# Patient Record
Sex: FEMALE | Race: WHITE | NOT HISPANIC OR LATINO | URBAN - METROPOLITAN AREA
[De-identification: names, ages, dates, MRNs, and addresses within clinical notes are randomized per-mention and may not be internally consistent; named-entity substitution may affect disease eponyms.]

---

## 2017-06-13 ENCOUNTER — OFFICE VISIT - HEALTHEAST (OUTPATIENT)
Dept: GERIATRICS | Facility: CLINIC | Age: 82
End: 2017-06-13

## 2017-06-13 ENCOUNTER — AMBULATORY - HEALTHEAST (OUTPATIENT)
Dept: ADMINISTRATIVE | Facility: CLINIC | Age: 82
End: 2017-06-13

## 2017-06-13 DIAGNOSIS — I10 HYPERTENSION: ICD-10-CM

## 2017-06-13 DIAGNOSIS — N28.9 KIDNEY DISEASE: ICD-10-CM

## 2017-06-13 DIAGNOSIS — R41.0 CONFUSION: ICD-10-CM

## 2017-06-13 DIAGNOSIS — I50.9 CONGESTIVE HEART FAILURE (H): ICD-10-CM

## 2017-06-13 RX ORDER — BISACODYL 5 MG/1
5 TABLET, DELAYED RELEASE ORAL DAILY PRN
Status: SHIPPED | COMMUNITY
Start: 2017-06-08

## 2017-06-13 RX ORDER — TORSEMIDE 20 MG/1
40 TABLET ORAL 2 TIMES DAILY
Status: SHIPPED | COMMUNITY
Start: 2017-06-10

## 2017-06-13 RX ORDER — ACETAMINOPHEN 325 MG/1
325 TABLET ORAL EVERY 4 HOURS PRN
Status: SHIPPED | COMMUNITY
Start: 2017-06-10

## 2017-06-13 RX ORDER — CARVEDILOL 6.25 MG/1
6.25 TABLET ORAL 2 TIMES DAILY WITH MEALS
Status: SHIPPED | COMMUNITY
Start: 2017-06-08

## 2017-06-13 RX ORDER — LANOLIN ALCOHOL/MO/W.PET/CERES
3 CREAM (GRAM) TOPICAL AT BEDTIME
Status: SHIPPED | COMMUNITY
Start: 2017-06-10

## 2017-06-20 ENCOUNTER — OFFICE VISIT - HEALTHEAST (OUTPATIENT)
Dept: GERIATRICS | Facility: CLINIC | Age: 82
End: 2017-06-20

## 2017-06-20 DIAGNOSIS — R41.0 CONFUSION: ICD-10-CM

## 2017-06-20 DIAGNOSIS — I10 HYPERTENSION: ICD-10-CM

## 2017-06-20 DIAGNOSIS — N28.9 KIDNEY DISEASE: ICD-10-CM

## 2017-06-20 DIAGNOSIS — I50.9 CONGESTIVE HEART FAILURE (H): ICD-10-CM

## 2017-06-22 ENCOUNTER — OFFICE VISIT - HEALTHEAST (OUTPATIENT)
Dept: GERIATRICS | Facility: CLINIC | Age: 82
End: 2017-06-22

## 2017-06-22 DIAGNOSIS — I50.9 CONGESTIVE HEART FAILURE (H): ICD-10-CM

## 2017-06-22 DIAGNOSIS — N28.9 KIDNEY DISEASE: ICD-10-CM

## 2017-06-22 DIAGNOSIS — I10 HYPERTENSION: ICD-10-CM

## 2017-06-22 DIAGNOSIS — R41.0 CONFUSION: ICD-10-CM

## 2017-06-30 ENCOUNTER — AMBULATORY - HEALTHEAST (OUTPATIENT)
Dept: GERIATRICS | Facility: CLINIC | Age: 82
End: 2017-06-30

## 2021-05-31 VITALS — WEIGHT: 90 LBS

## 2021-06-11 NOTE — PROGRESS NOTES
[facility]      Code Status:  DNR/DNI  Visit Type: H & P     Facility:  [facility] [[id number]]           History of Present Illness: Leisa Davila is a 89 y.o. female who is currently admitted to the TCU as a transfer from the hospital  As per the history this is a 89-year-old who was admitted on 6/5/2017 with acute on chronic congestive heart failure.  She has a past medical history for hypertension peripheral vascular disease and hyperlipidemia.  She was recently started on Lasix for dyspnea and elevated bnp.  Subsequently she was switched over to torsemide.  An outpatient echocardiogram had shown ejection fraction of 30% and she presented to the hospital with progressive dyspnea and she had gained greater than 10 pounds of weight  Cardiology saw her and switched her to torsemide 20 mg daily along with Lasix she was also switched from metoprolol to Coreg she was noted to have an NST MI with EKG showing some changes and elevated troponins  However an echo angiogram has been deferred and will be discussed with the patient as an outpatient  Her blood pressures were running on the low side which was felt to limited ability to diurese in addition she has chronic kidney disease stage III.  Her admission creatinine was 2.1 and it was felt overall with her advanced age family was not too keen on invasive workup.  L in the hospital she was seen by psychiatry due to progressive confusion/delirium.  She remains confused has been noted to be an elopement risk and wondering at nighttime.  She told me she was missing her purse which had $1000 and it.  She was started on melatonin and given olanzapine at bedtime for 5 days only with Haldol in the hospital  Eating poorly as per her with 0 appetite she remains weak and disabled.    Past Medical History:   Diagnosis Date     Acute congestive heart failure      Acute coronary syndrome      Allergic conjunctivitis      Anemia      CKD  (chronic kidney disease) stage 3, GFR 30-59 ml/min      Claudication      HTN (hypertension)      Lung nodule      Malignant neoplasm of colon      MI, acute, non ST segment elevation      Non Hodgkin's lymphoma      Nonexudative senile macular degeneration of retina      Pure hypercholesterolemia      PVD (peripheral vascular disease)      Sepsis      Past Surgical History:   Procedure Laterality Date     ANGIOPLASTY Left     left leg     BREAST BIOPSY Left      COLECTOMY       Family History   Problem Relation Age of Onset     Stroke Father      Cancer Sister      Social History     Social History     Marital status: N/A     Spouse name: N/A     Number of children: N/A     Years of education: N/A     Occupational History     Not on file.     Social History Main Topics     Smoking status: Never Smoker     Smokeless tobacco: Never Used     Alcohol use No     Drug use: No     Sexual activity: Not on file     Other Topics Concern     Not on file     Social History Narrative     No narrative on file     Current Outpatient Prescriptions   Medication Sig Dispense Refill     acetaminophen (TYLENOL) 325 MG tablet Take 325 mg by mouth every 4 (four) hours as needed.       aspirin 81 MG EC tablet Take 1 tablet by mouth daily.       bisacodyl (DULCOLAX) 5 mg EC tablet Take 5 mg by mouth daily as needed.       carvedilol (COREG) 6.25 MG tablet Take 6.25 mg by mouth 2 (two) times a day with meals.       melatonin 3 mg Tab tablet Take 3 mg by mouth at bedtime.       OLANZapine (ZYPREXA) 5 MG tablet Take 5 mg by mouth at bedtime.       torsemide (DEMADEX) 20 MG tablet Take 40 mg by mouth 2 (two) times a day.       No current facility-administered medications for this visit.      Allergies   Allergen Reactions     Codeine Other (See Comments)     Hallucinations          Review of Systems:    Constitutional: Negative.  Negative for fever, chills, has activity change, appetite change and fatigue.   HENT: Negative for congestion and  facial swelling.    Eyes: Negative for photophobia, redness and visual disturbance.   Respiratory: Negative for cough and chest tightness.    Cardiovascular: Negative for chest pain, palpitations and leg swelling.   Gastrointestinal: Negative for nausea, diarrhea, constipation, blood in stool and abdominal distention.   Genitourinary: Negative.    Musculoskeletal: Negative.  weak  Skin: Negative.    Neurological: Negative for dizziness, tremors, syncope,has  weakness, light-headedness and headaches.   Hematological: Does not bruise/bleed easily.   Psychiatric/Behavioral: Negative.  confused    Vitals:    17 1625   BP: 132/67   Pulse: 75   Resp: 18   Temp: 98  F (36.7  C)       Physical Exam:    GENERAL: no acute distress. Cooperative in conversation.   HEENT: pupils are equal, round and reactive. Oral mucosa is moist and intact.  RESP:Chest symmetric. Regular respiratory rate. No stridor.  CVS: S1S2; systolic murmur heard  ABD: Nondistended, soft.  EXTREMITIES: trace lower extremity edema.   NEURO: non focal. Alert and oriented x 2 very confused.   PSYCH: within normal limits. No depression or anxiety.  SKIN: warm dry intact       Labs:       User, Prosolv - 2017 12:42 PM CDT    ECHO LIMITED WO CONTRAST SIRENA TURCIOS 2017 10:28    [facility] [place] [address]  [phone] [email or web address]    Transthoracic Echo Report  SIRENA TURCIOS  Jaden ID: [id number] Age: 89 : 1924 Ordering Provider: STEVE FERNANDEZ  Exam Date: 2017 10:28 Gender: F Sonographer: FENG  Accession #: [id number] Height: 64 in BSA: 1.46 m  BP: 121 / 76  Weight: 101 lbs BMI: 17.3 kg/m  HR: 52  Location: Inpatient (Portable) Rhythm: Sinus with frequent ectopy  Procedure Components: Limited 2D imaging, Color Doppler, Limited Spectral Doppler  Indications: MI  Technical Quality: Adequate Contrast: None    Final Conclusion Previous Study:  5/18/17  1) Diffuse left ventricular hypokinesis with severe depression of global left ventricular systolic function. Estimated ejection fraction ~  25%    2) Mild mitral and aortic insufficiencies    3) Moderate to moderately sever tricuspid insufficiency. Moderate elevation of estimated pulmonary arterial systolic pressure    4) Bi-atrial enlargement    5) Mild to moderate impairment of right ventricular systolic function    Compared to prior echocardiogram (May 18, 2017), probably no significant interval change. The degree of tricuspid insufficiency may  have increased, but this sis probably a reflection of more images of tricuspid valve obtained on the current study.    Estimated EF: 25%  FINDINGS  Left Ventricle Normal LV wall thickness. Diffuse left ventricular hypokinesis. Estimated ejection fraction ~ 25%  Diastolic Function Indeterminate left ventricular diastolic function.  Right Ventricle Normal RV size. Mild to moderate impairment of right ventricular systolic function.  Left Atrium Mild LAE  Right Atrium Mild WILL  Atrial Septum No left to right shunt was detected by limited color flow Doppler interrogation of the interatrial septum.  Inferior Vena Cava Normal inferior vena cava (IVC) size.  Aortic Valve Moderate calcification of the non-coronary leaflet of the aortic valve. Mild aortic insufficiency  Mitral Valve Moderate mitral anular calcification. There is calcification of the posterior leaflet as well Mild mitral insufficiency  Tricuspid Valve Moderate to moderately severe tricuspid insufficiency. Estimated RV systolic pressure of 48.2 mmHg + RA  pressure.  Pulmonic Valve Not well seen  Aorta Aortic root and proximal ascending aorta are normal in size.  Pericardium No pericardial effusion.  Add. Comments Left pleural effusion.  MEASUREMENTS (Male / Female) Normal Values  2D MEASUREMENTS AND LV FUNCTION  IVS Diastolic Thickness 0.924 cm < 1.1 cm / < 1.0 cm  LV Diastolic Diameter PLAX 4.65 cm 4.2 -  5.9 / 3.9 - 5.3 cm  LVPW Diastolic Thickness 0.81 cm < 1.1 cm / < 1.0 cm  LV Systolic Diameter PLAX 4.08 cm  LVOT Diameter 1.8 cm  LVOT Cardiac Output 1.6 l/min  LVOT Cardiac Index 1.12 l/min m   LVOT Stroke Volume 30.8 ml  Stroke Volume Index 21.6 ml/m   LA Systolic Diameter LX 3.8 cm 3.0 - 4.0 / 2.7 - 3.8 cm  LV Ejection Fraction MOD BP 20.4 % >= 55 %  IVC Diameter Inspiration 1.6 cm  IVC Diameter Expiration 2 cm    DIASTOLOGY  Mitral E Point Velocity 1.14 m/sec 0.70 - 1.02 m/sec  Mitral A Point Velocity 0.422 m/sec 0.06 - 1.06 m/sec  Mitral E to A Ratio 2.7 1.1 - 2.1  MV Deceleration Time 82 msec 167 - 231 msec  LV E' Lateral Velocity 0.049 m/sec  Mitral E to LV E' Lateral Ratio 23.3    AORTIC VALVE  Aortic Root Diameter 2.8 cm  LVOT Peak Velocity 0.661 m/sec  LVOT Velocity Time Integral 12.1 cm  AI Pressure Half Time 441 msec    TRICUSPID VALVE AND ESTIMATED PRESSURES  TR Peak Velocity 3.47 m/sec  TR Peak Gradient 48.2 mmHg        Jim Gonzales MD  (Electronically Signed) [facility]redited [facility]  Final Date: 06 June 2017 12:42          ICD-10 Codes: I232     Back to top of Results      BRAIN NATRIURETIC PEPTIDE (06/06/2017 8:24 AM)  Only the most recent of 3 results within the time period is included.    BRAIN NATRIURETIC PEPTIDE (06/06/2017 8:24 AM)   Component Value Ref Range   BRAIN ANDRÉS PEPTIDE 7232 (H) <265 pg/mL     BRAIN NATRIURETIC PEPTIDE (06/06/2017 8:24 AM)   Specimen Performing Laboratory   [facility]    SENDOUT INTERNAL ZIP [id number]    [address] [place] [address]         Assessment/Plan:    Acute on chronic congestive heart failure with systolic dysfunction with an ejection fraction of 30%.  She was admitted in the hospital and evaluated by cardiology  Early discharge in a stable condition and remains on torsemide with discontinuation of her Lasix  NST RICHARD with EKG showing ST and T-wave changes and troponins elevated to 5.6.  Evaluated by cardiology but family  did not want to pursue aggressive interventions and refused angiography in light of patient's advanced age and renal failure.  They will do an outpatient follow-up with cardiology as scheduled  Hypertension with hypotension reported in the hospital she was taken off metoprolol and switched over to Coreg at a lower dose.  Continue to monitor blood pressures in the TCU.  Acute encephalopathy/delirium with concerning regarding underlying major neurocognitive disorder  Patient remains extremely confused with paranoid statements on exam today  She was discharged from the hospital on melatonin for sleep-wake cycle disturbances as well as given Zyprexa for 5 days by psychiatric continue to monitor closely she remains confused she was wandering last night and remains an elopement risk  Chronic kidney disease stage III with a baseline creatinine of 2.2.  She was seen by nephrology in the hospital and her torsemide dosage was increased.  History of non-Hodgkin's lymphoma  History of malignant colon cancer status post resection  Recent diagnosis of a lung nodule for which patient declined further workup  Debilitation she is here for PT OT and rehab and my suspicion is that she may need alternative placement due to her advanced age and inability to function in her own home    Total time spent was 45 minutes, more than half of it was in face-to-face counseling regarding disease state, treatment, side effects, documentation, review of clinical data and coordination of care    Electronically signed by: LETTY Reilly  This progress note was completed using Dragon software and there may be grammatical errors.

## 2021-06-11 NOTE — PROGRESS NOTES
[facility]      Code Status:  DNR/DNI  Visit Type: Review Of Multiple Medical Conditions     Facility:  [facility] [[id number]]           History of Present Illness: Leisa Davila is a 89 y.o. female who is currently admitted to the TCU as a transfer from the hospital  As per the history this is a 89-year-old who was admitted on 6/5/2017 with acute on chronic congestive heart failure.  She has a past medical history for hypertension peripheral vascular disease and hyperlipidemia.  She was recently started on Lasix for dyspnea and elevated bnp.  Subsequently she was switched over to torsemide.  An outpatient echocardiogram had shown ejection fraction of 30% and she presented to the hospital with progressive dyspnea and she had gained greater than 10 pounds of weight  Cardiology saw her and switched her to torsemide 20 mg daily along with Lasix she was also switched from metoprolol to Coreg she was noted to have an NST MI with EKG showing some changes and elevated troponins  However an echo angiogram has been deferred and will be discussed with the patient as an outpatient  Her blood pressures were running on the low side which was felt to limited ability to diurese in addition she has chronic kidney disease stage III.  Her admission creatinine was 2.1 and it was felt overall with her advanced age family was not too keen on invasive workup.  Recheck labs done today continue to show an impaired creatinine at 2.49.  L in the hospital she was seen by psychiatry due to progressive confusion/delirium.  She remains confused has been noted to be an elopement risk and wondering at nighttime.  She told me she was missing her purse which had $1000 and it.  She was started on melatonin and given olanzapine at bedtime for 5 days only with Haldol in the hospital  Eating poorly as per her with 0 appetite she remains weak and disabled.  She had a fall last night when she was found in the  bathroom again this is not a verified fall due to her confusion but she had an episode of incontinence associated with it.  She remains quite confused and told me she was discharging to her home today and was backing to do that    Past Medical History:   Diagnosis Date     Acute congestive heart failure      Acute coronary syndrome      Allergic conjunctivitis      Anemia      CKD (chronic kidney disease) stage 3, GFR 30-59 ml/min      Claudication      HTN (hypertension)      Lung nodule      Malignant neoplasm of colon      MI, acute, non ST segment elevation      Non Hodgkin's lymphoma      Nonexudative senile macular degeneration of retina      Pure hypercholesterolemia      PVD (peripheral vascular disease)      Sepsis      Past Surgical History:   Procedure Laterality Date     ANGIOPLASTY Left     left leg     BREAST BIOPSY Left      COLECTOMY       Family History   Problem Relation Age of Onset     Stroke Father      Cancer Sister      Social History     Social History     Marital status:      Spouse name: N/A     Number of children: N/A     Years of education: N/A     Occupational History     Not on file.     Social History Main Topics     Smoking status: Never Smoker     Smokeless tobacco: Never Used     Alcohol use No     Drug use: No     Sexual activity: Not on file     Other Topics Concern     Not on file     Social History Narrative     No narrative on file     Current Outpatient Prescriptions   Medication Sig Dispense Refill     acetaminophen (TYLENOL) 325 MG tablet Take 325 mg by mouth every 4 (four) hours as needed.       aspirin 81 MG EC tablet Take 1 tablet by mouth daily.       bisacodyl (DULCOLAX) 5 mg EC tablet Take 5 mg by mouth daily as needed.       carvedilol (COREG) 6.25 MG tablet Take 6.25 mg by mouth 2 (two) times a day with meals.       melatonin 3 mg Tab tablet Take 3 mg by mouth at bedtime.       torsemide (DEMADEX) 20 MG tablet Take 40 mg by mouth 2 (two) times a day.       No  current facility-administered medications for this visit.      Allergies   Allergen Reactions     Codeine Other (See Comments)     Hallucinations          Review of Systems:    Constitutional: Negative.  Negative for fever, chills, has activity change, appetite change and fatigue.   HENT: Negative for congestion and facial swelling.    Eyes: Negative for photophobia, redness and visual disturbance.   Respiratory: Negative for cough and chest tightness.    Cardiovascular: Negative for chest pain, palpitations and leg swelling.   Gastrointestinal: Negative for nausea, diarrhea, constipation, blood in stool and abdominal distention.   Genitourinary: Negative.    Musculoskeletal: Negative.  weak  Skin: Negative.    Neurological: Negative for dizziness, tremors, syncope,has  weakness, light-headedness and headaches.   Hematological: Does not bruise/bleed easily.   Psychiatric/Behavioral: Negative.  confused    Vitals:    17 1520   BP: 134/76   Pulse: 65   Resp: 18   Temp: 97  F (36.1  C)       Physical Exam:    GENERAL: no acute distress. Cooperative in conversation.   HEENT: pupils are equal, round and reactive. Oral mucosa is moist and intact.  RESP:Chest symmetric. Regular respiratory rate. No stridor.  CVS: S1S2; systolic murmur heard  ABD: Nondistended, soft.  EXTREMITIES: trace lower extremity edema.   NEURO: non focal. Alert and oriented x 2 very confused.   PSYCH: within normal limits. No depression or anxiety.  SKIN: warm dry intact       Labs:       User, Prosolv - 2017 12:42 PM CDT    ECHO LIMITED WO CONTRAST SIRENA TURCIOS 2017 10:28    [facility] [place] [address]  [phone] [email or web address]    Transthoracic Echo Report  SIRENA TURCIOS  [place]ancho[place] ID: [id number] Age: 89 : 1924 Ordering Provider: STEVE FERNANDEZ  Exam Date: 2017 10:28 Gender: F Sonographer: FENG  Accession #: [id number] Height: 64  in BSA: 1.46 m  BP: 121 / 76  Weight: 101 lbs BMI: 17.3 kg/m  HR: 52  Location: Inpatient (Portable) Rhythm: Sinus with frequent ectopy  Procedure Components: Limited 2D imaging, Color Doppler, Limited Spectral Doppler  Indications: MI  Technical Quality: Adequate Contrast: None    Final Conclusion Previous Study: 5/18/17  1) Diffuse left ventricular hypokinesis with severe depression of global left ventricular systolic function. Estimated ejection fraction ~  25%    2) Mild mitral and aortic insufficiencies    3) Moderate to moderately sever tricuspid insufficiency. Moderate elevation of estimated pulmonary arterial systolic pressure    4) Bi-atrial enlargement    5) Mild to moderate impairment of right ventricular systolic function    Compared to prior echocardiogram (May 18, 2017), probably no significant interval change. The degree of tricuspid insufficiency may  have increased, but this sis probably a reflection of more images of tricuspid valve obtained on the current study.    Estimated EF: 25%  FINDINGS  Left Ventricle Normal LV wall thickness. Diffuse left ventricular hypokinesis. Estimated ejection fraction ~ 25%  Diastolic Function Indeterminate left ventricular diastolic function.  Right Ventricle Normal RV size. Mild to moderate impairment of right ventricular systolic function.  Left Atrium Mild LAE  Right Atrium Mild WILL  Atrial Septum No left to right shunt was detected by limited color flow Doppler interrogation of the interatrial septum.  Inferior Vena Cava Normal inferior vena cava (IVC) size.  Aortic Valve Moderate calcification of the non-coronary leaflet of the aortic valve. Mild aortic insufficiency  Mitral Valve Moderate mitral anular calcification. There is calcification of the posterior leaflet as well Mild mitral insufficiency  Tricuspid Valve Moderate to moderately severe tricuspid insufficiency. Estimated RV systolic pressure of 48.2 mmHg + RA  pressure.  Pulmonic Valve Not well  seen  Aorta Aortic root and proximal ascending aorta are normal in size.  Pericardium No pericardial effusion.  Add. Comments Left pleural effusion.  MEASUREMENTS (Male / Female) Normal Values  2D MEASUREMENTS AND LV FUNCTION  IVS Diastolic Thickness 0.924 cm < 1.1 cm / < 1.0 cm  LV Diastolic Diameter PLAX 4.65 cm 4.2 - 5.9 / 3.9 - 5.3 cm  LVPW Diastolic Thickness 0.81 cm < 1.1 cm / < 1.0 cm  LV Systolic Diameter PLAX 4.08 cm  LVOT Diameter 1.8 cm  LVOT Cardiac Output 1.6 l/min  LVOT Cardiac Index 1.12 l/min m   LVOT Stroke Volume 30.8 ml  Stroke Volume Index 21.6 ml/m   LA Systolic Diameter LX 3.8 cm 3.0 - 4.0 / 2.7 - 3.8 cm  LV Ejection Fraction MOD BP 20.4 % >= 55 %  IVC Diameter Inspiration 1.6 cm  IVC Diameter Expiration 2 cm    DIASTOLOGY  Mitral E Point Velocity 1.14 m/sec 0.70 - 1.02 m/sec  Mitral A Point Velocity 0.422 m/sec 0.06 - 1.06 m/sec  Mitral E to A Ratio 2.7 1.1 - 2.1  MV Deceleration Time 82 msec 167 - 231 msec  LV E' Lateral Velocity 0.049 m/sec  Mitral E to LV E' Lateral Ratio 23.3    AORTIC VALVE  Aortic Root Diameter 2.8 cm  LVOT Peak Velocity 0.661 m/sec  LVOT Velocity Time Integral 12.1 cm  AI Pressure Half Time 441 msec    TRICUSPID VALVE AND ESTIMATED PRESSURES  TR Peak Velocity 3.47 m/sec  TR Peak Gradient 48.2 mmHg        Jim Gonzales MD  (Electronically Signed) [facility]  Final Date: 06 June 2017 12:42          ICD-10 Codes: I232     Back to top of Results      BRAIN NATRIURETIC PEPTIDE (06/06/2017 8:24 AM)  Only the most recent of 3 results within the time period is included.    BRAIN NATRIURETIC PEPTIDE (06/06/2017 8:24 AM)   Component Value Ref Range   BRAIN ANDRÉS PEPTIDE 7232 (H) <265 pg/mL     BRAIN NATRIURETIC PEPTIDE (06/06/2017 8:24 AM)   Specimen Performing Laboratory   Blood [facility]    SENDOUT INTERNAL ZIP [id number]    [address] [place] [address]       Recent Results (from the past 240 hour(s))   Magnesium   Result Value Ref Range     Magnesium 2.3 1.8 - 2.6 mg/dL   Basic Metabolic Panel   Result Value Ref Range    Sodium 140 136 - 145 mmol/L    Potassium 3.5 3.5 - 5.0 mmol/L    Chloride 91 (L) 98 - 107 mmol/L    CO2 36 (H) 22 - 31 mmol/L    Anion Gap, Calculation 13 5 - 18 mmol/L    Glucose 94 70 - 125 mg/dL    Calcium 9.5 8.5 - 10.5 mg/dL    BUN 56 (H) 8 - 28 mg/dL    Creatinine 2.49 (H) 0.60 - 1.10 mg/dL    GFR MDRD Af Amer 22 (L) >60 mL/min/1.73m2    GFR MDRD Non Af Amer 18 (L) >60 mL/min/1.73m2   HM1 (CBC with Diff)   Result Value Ref Range    WBC 7.2 4.0 - 11.0 thou/uL    RBC 4.89 3.80 - 5.40 mill/uL    Hemoglobin 14.9 12.0 - 16.0 g/dL    Hematocrit 46.4 35.0 - 47.0 %    MCV 95 80 - 100 fL    MCH 30.5 27.0 - 34.0 pg    MCHC 32.1 32.0 - 36.0 g/dL    RDW 14.6 (H) 11.0 - 14.5 %    Platelets 194 140 - 440 thou/uL    MPV 11.3 8.5 - 12.5 fL    Neutrophils % 62 50 - 70 %    Lymphocytes % 24 20 - 40 %    Monocytes % 11 (H) 2 - 10 %    Eosinophils % 1 0 - 6 %    Basophils % 1 0 - 2 %    Neutrophils Absolute 4.5 2.0 - 7.7 thou/uL    Lymphocytes Absolute 1.8 0.8 - 4.4 thou/uL    Monocytes Absolute 0.8 0.0 - 0.9 thou/uL    Eosinophils Absolute 0.1 0.0 - 0.4 thou/uL    Basophils Absolute 0.1 0.0 - 0.2 thou/uL     Assessment/Plan:    Acute on chronic congestive heart failure with systolic dysfunction with an ejection fraction of 30%.  She was admitted in the hospital and evaluated by cardiology  Early discharge in a stable condition and remains on torsemide with discontinuation of her Lasix  NST RICHARD with EKG showing ST and T-wave changes and troponins elevated to 5.6.  Evaluated by cardiology but family did not want to pursue aggressive interventions and refused angiography in light of patient's advanced age and renal failure.  They will do an outpatient follow-up with cardiology as scheduled  Hypertension with hypotension reported in the hospital she was taken off metoprolol and switched over to Coreg at a lower dose.  Continue to monitor blood  pressures in the TCU. Stable .  Acute encephalopathy/delirium with concerning regarding underlying major neurocognitive disorder  Patient remains extremely confused with paranoid statements on exam today  She was discharged from the hospital on melatonin for sleep-wake cycle disturbances as well as given Zyprexa for 5 days by psychiatric continue to monitor closely she remains confused she was wandering last night and remains an elopement risk  She was wandering last night with a fall risk and a reported fall.  SLUMS 6/30  Chronic kidney disease stage III with a baseline creatinine of 2.2.  She was seen by nephrology in the hospital and her torsemide dosage was increased.  We will recheck another lab due to impaired creatinines  History of non-Hodgkin's lymphoma  History of malignant colon cancer status post resection  Recent diagnosis of a lung nodule for which patient declined further workup  Debilitation she is here for PT OT and rehab .  She is independent in her ambulation but remains a fall risk with a fall with score of 15/28 and a tug score of 16.2 she will need placement in a memory care unit based on her cognitive scores which are extremely poor  Total time spent was 35 minutes, more than half of it was in face-to-face counseling regarding disease state, treatment, side effects, documentation, review of clinical data and coordination of care    Electronically signed by: LETTY Reilly  This progress note was completed using Dragon software and there may be grammatical errors.

## 2021-06-11 NOTE — PROGRESS NOTES
[facility]      Code Status:  DNR/DNI  Visit Type: Review Of Multiple Medical Conditions     Facility:  [facility] [[id number]]           History of Present Illness: Leisa Davila is a 89 y.o. female who is currently admitted to the TCU as a transfer from the hospital  As per the history this is a 89-year-old who was admitted on 6/5/2017 with acute on chronic congestive heart failure.  She has a past medical history for hypertension peripheral vascular disease and hyperlipidemia.  She was recently started on Lasix for dyspnea and elevated bnp.  Subsequently she was switched over to torsemide.  An outpatient echocardiogram had shown ejection fraction of 30% and she presented to the hospital with progressive dyspnea and she had gained greater than 10 pounds of weight  Cardiology saw her and switched her to torsemide 20 mg daily along with Lasix she was also switched from metoprolol to Coreg she was noted to have an NST MI with EKG showing some changes and elevated troponins  However an echo angiogram has been deferred and will be discussed with the patient as an outpatient  Her blood pressures were running on the low side which was felt to limited ability to diurese in addition she has chronic kidney disease stage III.  Her admission creatinine was 2.1 and it was felt overall with her advanced age family was not too keen on invasive workup.  Recheck labs done today continue to show an impaired creatinine at 2.49.  L in the hospital she was seen by psychiatry due to progressive confusion/delirium.  She remains confused has been noted to be an elopement risk and wondering at nighttime.  She told me she was missing her purse which had $1000 and it.  She was started on melatonin and given olanzapine at bedtime for 5 days only with Haldol in the hospital  Eating poorly as per her with 0 appetite she remains weak and disabled.  She remains quite confused and told me she was  discharging to her home today and was backing to do that.HAS NO PAIN  Her weights are stable.    Past Medical History:   Diagnosis Date     Acute congestive heart failure      Acute coronary syndrome      Allergic conjunctivitis      Anemia      CKD (chronic kidney disease) stage 3, GFR 30-59 ml/min      Claudication      HTN (hypertension)      Lung nodule      Malignant neoplasm of colon      MI, acute, non ST segment elevation      Non Hodgkin's lymphoma      Nonexudative senile macular degeneration of retina      Pure hypercholesterolemia      PVD (peripheral vascular disease)      Sepsis      Past Surgical History:   Procedure Laterality Date     ANGIOPLASTY Left     left leg     BREAST BIOPSY Left      COLECTOMY       Family History   Problem Relation Age of Onset     Stroke Father      Cancer Sister      Social History     Social History     Marital status:      Spouse name: N/A     Number of children: N/A     Years of education: N/A     Occupational History     Not on file.     Social History Main Topics     Smoking status: Never Smoker     Smokeless tobacco: Never Used     Alcohol use No     Drug use: No     Sexual activity: Not on file     Other Topics Concern     Not on file     Social History Narrative     No narrative on file     Current Outpatient Prescriptions   Medication Sig Dispense Refill     acetaminophen (TYLENOL) 325 MG tablet Take 325 mg by mouth every 4 (four) hours as needed.       aspirin 81 MG EC tablet Take 1 tablet by mouth daily.       bisacodyl (DULCOLAX) 5 mg EC tablet Take 5 mg by mouth daily as needed.       carvedilol (COREG) 6.25 MG tablet Take 6.25 mg by mouth 2 (two) times a day with meals.       melatonin 3 mg Tab tablet Take 3 mg by mouth at bedtime.       torsemide (DEMADEX) 20 MG tablet Take 40 mg by mouth 2 (two) times a day.       No current facility-administered medications for this visit.      Allergies   Allergen Reactions     Codeine Other (See Comments)      Hallucinations          Review of Systems:    Constitutional: Negative.  Negative for fever, chills, has activity change, appetite change and fatigue.   HENT: Negative for congestion and facial swelling.    Eyes: Negative for photophobia, redness and visual disturbance.   Respiratory: Negative for cough and chest tightness.    Cardiovascular: Negative for chest pain, palpitations and leg swelling.   Gastrointestinal: Negative for nausea, diarrhea, constipation, blood in stool and abdominal distention.   Genitourinary: Negative.    Musculoskeletal: Negative.  weak  Skin: Negative.    Neurological: Negative for dizziness, tremors, syncope,has  weakness, light-headedness and headaches.   Hematological: Does not bruise/bleed easily.   Psychiatric/Behavioral: Negative.  confused    Vitals:    17 1157   BP: 127/65   Pulse: 97   Temp: 98  F (36.7  C)       Physical Exam:    GENERAL: no acute distress. Cooperative in conversation.   HEENT: pupils are equal, round and reactive. Oral mucosa is moist and intact.  RESP:Chest symmetric. Regular respiratory rate. No stridor.  CVS: S1S2; systolic murmur heard  ABD: Nondistended, soft.  EXTREMITIES: trace lower extremity edema. Skin tear rt elbow from fall.  NEURO: non focal. Alert and oriented x 2 very confused.   PSYCH: within normal limits. No depression or anxiety.  SKIN: warm dry intact       Labs:       User, Ivana - 2017 12:42 PM CDT    ECHO LIMITED WO CONTRAST SIRENA TURCIOS 2017 10:28    [facility] [place] [address]  [phone] [email or web address]    Transthoracic Echo Report  SIRENA TURCIOS  Excellian ID: [id number] Age: 89 : 1924 Ordering Provider: STEVE FERNANDEZ  Exam Date: 2017 10:28 Gender: F Sonographer: FENG  Accession #: [id number] Height: 64 in BSA: 1.46 m  BP: 121 / 76  Weight: 101 lbs BMI: 17.3 kg/m  HR: 52  Location: Inpatient (Portable) Rhythm:  Sinus with frequent ectopy  Procedure Components: Limited 2D imaging, Color Doppler, Limited Spectral Doppler  Indications: MI  Technical Quality: Adequate Contrast: None    Final Conclusion Previous Study: 5/18/17  1) Diffuse left ventricular hypokinesis with severe depression of global left ventricular systolic function. Estimated ejection fraction ~  25%    2) Mild mitral and aortic insufficiencies    3) Moderate to moderately sever tricuspid insufficiency. Moderate elevation of estimated pulmonary arterial systolic pressure    4) Bi-atrial enlargement    5) Mild to moderate impairment of right ventricular systolic function    Compared to prior echocardiogram (May 18, 2017), probably no significant interval change. The degree of tricuspid insufficiency may  have increased, but this sis probably a reflection of more images of tricuspid valve obtained on the current study.    Estimated EF: 25%  FINDINGS  Left Ventricle Normal LV wall thickness. Diffuse left ventricular hypokinesis. Estimated ejection fraction ~ 25%  Diastolic Function Indeterminate left ventricular diastolic function.  Right Ventricle Normal RV size. Mild to moderate impairment of right ventricular systolic function.  Left Atrium Mild LAE  Right Atrium Mild WILL  Atrial Septum No left to right shunt was detected by limited color flow Doppler interrogation of the interatrial septum.  Inferior Vena Cava Normal inferior vena cava (IVC) size.  Aortic Valve Moderate calcification of the non-coronary leaflet of the aortic valve. Mild aortic insufficiency  Mitral Valve Moderate mitral anular calcification. There is calcification of the posterior leaflet as well Mild mitral insufficiency  Tricuspid Valve Moderate to moderately severe tricuspid insufficiency. Estimated RV systolic pressure of 48.2 mmHg + RA  pressure.  Pulmonic Valve Not well seen  Aorta Aortic root and proximal ascending aorta are normal in size.  Pericardium No pericardial effusion.  Add.  Comments Left pleural effusion.  MEASUREMENTS (Male / Female) Normal Values  2D MEASUREMENTS AND LV FUNCTION  IVS Diastolic Thickness 0.924 cm < 1.1 cm / < 1.0 cm  LV Diastolic Diameter PLAX 4.65 cm 4.2 - 5.9 / 3.9 - 5.3 cm  LVPW Diastolic Thickness 0.81 cm < 1.1 cm / < 1.0 cm  LV Systolic Diameter PLAX 4.08 cm  LVOT Diameter 1.8 cm  LVOT Cardiac Output 1.6 l/min  LVOT Cardiac Index 1.12 l/min m   LVOT Stroke Volume 30.8 ml  Stroke Volume Index 21.6 ml/m   LA Systolic Diameter LX 3.8 cm 3.0 - 4.0 / 2.7 - 3.8 cm  LV Ejection Fraction MOD BP 20.4 % >= 55 %  IVC Diameter Inspiration 1.6 cm  IVC Diameter Expiration 2 cm    DIASTOLOGY  Mitral E Point Velocity 1.14 m/sec 0.70 - 1.02 m/sec  Mitral A Point Velocity 0.422 m/sec 0.06 - 1.06 m/sec  Mitral E to A Ratio 2.7 1.1 - 2.1  MV Deceleration Time 82 msec 167 - 231 msec  LV E' Lateral Velocity 0.049 m/sec  Mitral E to LV E' Lateral Ratio 23.3    AORTIC VALVE  Aortic Root Diameter 2.8 cm  LVOT Peak Velocity 0.661 m/sec  LVOT Velocity Time Integral 12.1 cm  AI Pressure Half Time 441 msec    TRICUSPID VALVE AND ESTIMATED PRESSURES  TR Peak Velocity 3.47 m/sec  TR Peak Gradient 48.2 mmHg        Jim Gonzales MD  (Electronically Signed) [facility]  Final Date: 06 June 2017 12:42          ICD-10 Codes: I232     Back to top of Results      BRAIN NATRIURETIC PEPTIDE (06/06/2017 8:24 AM)  Only the most recent of 3 results within the time period is included.    BRAIN NATRIURETIC PEPTIDE (06/06/2017 8:24 AM)   Component Value Ref Range   BRAIN ANDRÉS PEPTIDE 7232 (H) <265 pg/mL     BRAIN NATRIURETIC PEPTIDE (06/06/2017 8:24 AM)   Specimen Performing Laboratory   Blood [facility]    SENDOUT INTERNAL ZIP [id number]    [address] [place] [address]       Recent Results (from the past 240 hour(s))   Magnesium   Result Value Ref Range    Magnesium 2.3 1.8 - 2.6 mg/dL   Basic Metabolic Panel   Result Value Ref Range    Sodium 140 136 - 145 mmol/L     Potassium 3.5 3.5 - 5.0 mmol/L    Chloride 91 (L) 98 - 107 mmol/L    CO2 36 (H) 22 - 31 mmol/L    Anion Gap, Calculation 13 5 - 18 mmol/L    Glucose 94 70 - 125 mg/dL    Calcium 9.5 8.5 - 10.5 mg/dL    BUN 56 (H) 8 - 28 mg/dL    Creatinine 2.49 (H) 0.60 - 1.10 mg/dL    GFR MDRD Af Amer 22 (L) >60 mL/min/1.73m2    GFR MDRD Non Af Amer 18 (L) >60 mL/min/1.73m2   HM1 (CBC with Diff)   Result Value Ref Range    WBC 7.2 4.0 - 11.0 thou/uL    RBC 4.89 3.80 - 5.40 mill/uL    Hemoglobin 14.9 12.0 - 16.0 g/dL    Hematocrit 46.4 35.0 - 47.0 %    MCV 95 80 - 100 fL    MCH 30.5 27.0 - 34.0 pg    MCHC 32.1 32.0 - 36.0 g/dL    RDW 14.6 (H) 11.0 - 14.5 %    Platelets 194 140 - 440 thou/uL    MPV 11.3 8.5 - 12.5 fL    Neutrophils % 62 50 - 70 %    Lymphocytes % 24 20 - 40 %    Monocytes % 11 (H) 2 - 10 %    Eosinophils % 1 0 - 6 %    Basophils % 1 0 - 2 %    Neutrophils Absolute 4.5 2.0 - 7.7 thou/uL    Lymphocytes Absolute 1.8 0.8 - 4.4 thou/uL    Monocytes Absolute 0.8 0.0 - 0.9 thou/uL    Eosinophils Absolute 0.1 0.0 - 0.4 thou/uL    Basophils Absolute 0.1 0.0 - 0.2 thou/uL     Assessment/Plan:    Acute on chronic congestive heart failure with systolic dysfunction with an ejection fraction of 30%.  She was admitted in the hospital and evaluated by cardiology  Early discharge in a stable condition and remains on torsemide with discontinuation of her Lasix  NST RICHARD with EKG showing ST and T-wave changes and troponins elevated to 5.6.  Evaluated by cardiology but family did not want to pursue aggressive interventions and refused angiography in light of patient's advanced age and renal failure.  They will do an outpatient follow-up with cardiology as scheduled  Hypertension with hypotension reported in the hospital she was taken off metoprolol and switched over to Coreg at a lower dose.  Continue to monitor blood pressures in the TCU. Stable .  Acute encephalopathy/delirium with concerning regarding underlying major neurocognitive  disorder  Patient remains extremely confused with paranoid statements on exam today  She was discharged from the hospital on melatonin for sleep-wake cycle disturbances as well as given Zyprexa for 5 days by psychiatric continue to monitor closely she remains confused and remains an elopement risk  SLUMS 6/30  Chronic kidney disease stage III with a baseline creatinine of 2.2.  She was seen by nephrology in the hospital and her torsemide dosage was increased.  We will recheck another lab due to impaired creatinines-remains impaired at 2.49  Monitor closely.  History of non-Hodgkin's lymphoma  History of malignant colon cancer status post resection  Recent diagnosis of a lung nodule for which patient declined further workup  Debilitation she is here for PT OT and rehab .  She is independent in her ambulation but remains a fall risk with a fall with score of 15/28 and a tug score of 16.2 she will need placement in a memory care unit based on her cognitive scores which are extremely poor  Her weights are stable.      Electronically signed by: LETTY Reilly  This progress note was completed using Dragon software and there may be grammatical errors.